# Patient Record
Sex: FEMALE
[De-identification: names, ages, dates, MRNs, and addresses within clinical notes are randomized per-mention and may not be internally consistent; named-entity substitution may affect disease eponyms.]

---

## 2022-04-04 ENCOUNTER — APPOINTMENT (OUTPATIENT)
Dept: PAIN MANAGEMENT | Facility: CLINIC | Age: 32
End: 2022-04-04

## 2022-04-29 PROBLEM — Z00.00 ENCOUNTER FOR PREVENTIVE HEALTH EXAMINATION: Status: ACTIVE | Noted: 2022-04-29

## 2022-06-13 ENCOUNTER — APPOINTMENT (OUTPATIENT)
Dept: PAIN MANAGEMENT | Facility: CLINIC | Age: 32
End: 2022-06-13
Payer: MEDICAID

## 2022-06-20 ENCOUNTER — APPOINTMENT (OUTPATIENT)
Dept: PAIN MANAGEMENT | Facility: CLINIC | Age: 32
End: 2022-06-20
Payer: MEDICAID

## 2022-06-20 VITALS — WEIGHT: 106 LBS | HEIGHT: 62 IN | BODY MASS INDEX: 19.51 KG/M2

## 2022-06-20 PROCEDURE — 99213 OFFICE O/P EST LOW 20 MIN: CPT

## 2022-06-20 NOTE — PHYSICAL EXAM
[de-identified] : Constitutional; Appears well, no apparent distress\par Ability to communicate: Normal \par Respiratory: non-labored breathing\par Skin: No rash noted\par Head: Normocephalic, atraumatic\par Neck: no visible thyroid enlargement\par Eyes: Extraocular movements intact\par Neurologic: Alert and oriented x3\par Psychiatric: normal mood, affect and behavior \par \par  [] : positive Sp[place]

## 2022-06-20 NOTE — DISCUSSION/SUMMARY
[de-identified] : After discussing various treatment options with the patient including but not limited to oral medications, physical therapy, exercise modalities as well as interventional spinal injections, we have decided with the following plan:\par \par - Continue home exercises, stretching, activity modification, physical therapy, and conservative care.\par - MRI report and/or images was reviewed and discussed with the patient.\par - Follow-up as needed.\par - Not interested in any medications or injections at this time. \par

## 2022-06-20 NOTE — HISTORY OF PRESENT ILLNESS
[4] : 4 [3] : 3 [Dull/Aching] : dull/aching [Radiating] : radiating [Stabbing] : stabbing [Intermittent] : intermittent [Household chores] : household chores [Sleep] : sleep [Physical therapy] : physical therapy [Nothing helps with pain getting better] : Nothing helps with pain getting better [] : yes [FreeTextEntry1] : r [FreeTextEntry7] : right trap/scapula [de-identified] : sleep on right side

## 2023-05-09 ENCOUNTER — APPOINTMENT (OUTPATIENT)
Dept: PAIN MANAGEMENT | Facility: CLINIC | Age: 33
End: 2023-05-09

## 2023-05-11 ENCOUNTER — APPOINTMENT (OUTPATIENT)
Dept: PAIN MANAGEMENT | Facility: CLINIC | Age: 33
End: 2023-05-11
Payer: MEDICAID

## 2023-05-11 VITALS — BODY MASS INDEX: 18.4 KG/M2 | HEIGHT: 62 IN | WEIGHT: 100 LBS

## 2023-05-11 PROCEDURE — 99214 OFFICE O/P EST MOD 30 MIN: CPT

## 2023-05-11 NOTE — HISTORY OF PRESENT ILLNESS
[Dull/Aching] : dull/aching [3] : 3 [Radiating] : radiating [Stabbing] : stabbing [Intermittent] : intermittent [Household chores] : household chores [Sleep] : sleep [Physical therapy] : physical therapy [Nothing helps with pain getting better] : Nothing helps with pain getting better [Neck] : neck [Upper back] : upper back [6] : 6 [FreeTextEntry1] : 05/11/23: Pain is now on the right aside of the neck and upper back and radiates down the right arm to the elbow but previously was radiating to the fingers. Did PT in Jan and cant start PT for another month. \par \par 6/20/22: Has done PT with little relief. Pain is on the right side of the neck and radiates towards the right shoulder. Not interested in any medications or any injections at this time. \par \par 12/28/21: Has been doing PT which is helping but switched insurances so needs new Rx. \par \par 8/20/21: Pain started to return and is on the right side of the neck and radiating towards the right shoulder. PT helped a lot last time. \par \par Initial HPI:\par Pain stared several years ago but only became constant in Jan 2019. Pain is on the right side of the neck and radiates towards the right shoulder and down the arm to the wrist described as a sharp shooting pain. Also has pain going down the right side of the upper back. Takes naproxen which helps. [] : no [FreeTextEntry7] : right trap/scapula [de-identified] : sleep on right side  [de-identified] : l mri

## 2023-05-11 NOTE — DISCUSSION/SUMMARY
[de-identified] : After discussing various treatment options with the patient including but not limited to oral medications, physical therapy, exercise, as well as interventional spinal injections, we have decided with the following plan:\par \par - Continue home exercises, stretching, activity modification, physical therapy, and conservative care.\par - Will order cervical MRI to rule out HNP. Please let this note serve as a formal request for authorization to perform a MRI of their Cervical Spine.\par - Follow-up post diagnostic imaging to review and discuss further treatment.\par - Will provide prescription for Physical Therapy.\par - Recommend Naproxen 500mg BID PRN. Potential adverse effects including but not limited to bleeding, ulcers, increased risk of hypertension, heart disease, kidney disease and stroke were discussed with the patient.  Medication would allow patient to be more mobile and perform ADL's.  Will continue to monitor patient and attempt to wean as soon as possible. Will use the lowest dosage possible for the shortest possible period of time.

## 2023-05-11 NOTE — PHYSICAL EXAM
[de-identified] : Constitutional; Appears well, no apparent distress\par Ability to communicate: Normal \par Respiratory: non-labored breathing\par Skin: No rash noted\par Head: Normocephalic, atraumatic\par Neck: no visible thyroid enlargement\par Eyes: Extraocular movements intact\par Neurologic: Alert and oriented x3\par Psychiatric: normal mood, affect and behavior \par \par  [] : negative [place]

## 2023-05-26 ENCOUNTER — APPOINTMENT (OUTPATIENT)
Dept: MRI IMAGING | Facility: CLINIC | Age: 33
End: 2023-05-26

## 2023-05-29 ENCOUNTER — FORM ENCOUNTER (OUTPATIENT)
Age: 33
End: 2023-05-29

## 2023-05-30 ENCOUNTER — APPOINTMENT (OUTPATIENT)
Dept: MRI IMAGING | Facility: CLINIC | Age: 33
End: 2023-05-30
Payer: MEDICAID

## 2023-05-30 PROCEDURE — 72141 MRI NECK SPINE W/O DYE: CPT

## 2023-06-13 ENCOUNTER — APPOINTMENT (OUTPATIENT)
Dept: PAIN MANAGEMENT | Facility: CLINIC | Age: 33
End: 2023-06-13
Payer: MEDICAID

## 2023-06-13 VITALS — WEIGHT: 100 LBS | HEIGHT: 62 IN | BODY MASS INDEX: 18.4 KG/M2

## 2023-06-13 PROCEDURE — 99214 OFFICE O/P EST MOD 30 MIN: CPT

## 2023-06-13 NOTE — PHYSICAL EXAM
[de-identified] : Constitutional; Appears well, no apparent distress\par Ability to communicate: Normal \par Respiratory: non-labored breathing\par Skin: No rash noted\par Head: Normocephalic, atraumatic\par Neck: no visible thyroid enlargement\par Eyes: Extraocular movements intact\par Neurologic: Alert and oriented x3\par Psychiatric: normal mood, affect and behavior \par \par  [] : negative [place]

## 2023-06-13 NOTE — DISCUSSION/SUMMARY
[de-identified] : After discussing various treatment options with the patient including but not limited to oral medications, physical therapy, exercise modalities as well as interventional spinal injections, we have decided with the following plan:\par \par - Continue Home exercises, stretching, activity modification, physical therapy, and conservative care.\par - MRI report and/or images was reviewed and discussed with the patient.\par - Recommend C7-T1 Cervical Epidural Steroid Injection under fluoroscopic guidance with image.\par - The risks, benefits and alternatives of the proposed procedure were explained in detail with the patient. The risks outlined include but are not limited to infection, bleeding, post-dural puncture headache, nerve injury, a temporary increase in pain, failure to resolve symptoms, allergic reaction, symptom recurrence, and possible elevation of blood sugar in diabetics. All questions were answered to patient's apparent satisfaction and he/she verbalized an understanding.\par - Patient is presenting with acute/sub-acute radicular pain with impairment in ADLs and functionality.  The pain has not responded to conservative care including NSAID therapy and/or physical therapy.  There is no bleeding tendency, unstable medical condition, or systemic infection.\par - Follow up in 1-2 weeks post injection for re-evaluation.\par - Will call to schedule.\par \par - Will provide prescription for Physical Therapy.

## 2023-06-13 NOTE — HISTORY OF PRESENT ILLNESS
[Neck] : neck [Upper back] : upper back [6] : 6 [3] : 3 [Dull/Aching] : dull/aching [Radiating] : radiating [Stabbing] : stabbing [Intermittent] : intermittent [Household chores] : household chores [Sleep] : sleep [Physical therapy] : physical therapy [Nothing helps with pain getting better] : Nothing helps with pain getting better [FreeTextEntry1] : 06/13/23 Pt here to go over C-spine MRI. Pain on the right side of the neck and radiates down the right arm to the fingers with associated numbness. Has been doing PT. \par \par MRI C-spine 5/30/23 independently reviewed: C5-6 right HNP with right C6 impingement. \par \par 05/11/23: Pain is now on the right side of the neck and upper back and radiates down the right arm to the elbow but previously was radiating to the fingers. Did PT in Jan and cant start PT for another month. \par \par 6/20/22: Has done PT with little relief. Pain is on the right side of the neck and radiates towards the right shoulder. Not interested in any medications or any injections at this time. \par \par 12/28/21: Has been doing PT which is helping but switched insurances so needs new Rx. \par \par 8/20/21: Pain started to return and is on the right side of the neck and radiating towards the right shoulder. PT helped a lot last time. \par \par Initial HPI:\par Pain stared several years ago but only became constant in Jan 2019. Pain is on the right side of the neck and radiates towards the right shoulder and down the arm to the wrist described as a sharp shooting pain. Also has pain going down the right side of the upper back. Takes naproxen which helps. [] : no [FreeTextEntry7] : right trap/scapula [de-identified] : sleep on right side  [de-identified] : l mri

## 2024-01-16 ENCOUNTER — APPOINTMENT (OUTPATIENT)
Dept: PAIN MANAGEMENT | Facility: CLINIC | Age: 34
End: 2024-01-16
Payer: COMMERCIAL

## 2024-01-16 VITALS — BODY MASS INDEX: 18.4 KG/M2 | WEIGHT: 100 LBS | HEIGHT: 62 IN

## 2024-01-16 DIAGNOSIS — M54.12 RADICULOPATHY, CERVICAL REGION: ICD-10-CM

## 2024-01-16 DIAGNOSIS — M54.2 CERVICALGIA: ICD-10-CM

## 2024-01-16 PROCEDURE — 99213 OFFICE O/P EST LOW 20 MIN: CPT

## 2024-01-16 RX ORDER — NAPROXEN 500 MG/1
500 TABLET ORAL
Qty: 30 | Refills: 0 | Status: ACTIVE | COMMUNITY
Start: 2023-05-11 | End: 1900-01-01

## 2024-01-16 NOTE — PHYSICAL EXAM
[de-identified] : Constitutional; Appears well, no apparent distress\par  Ability to communicate: Normal \par  Respiratory: non-labored breathing\par  Skin: No rash noted\par  Head: Normocephalic, atraumatic\par  Neck: no visible thyroid enlargement\par  Eyes: Extraocular movements intact\par  Neurologic: Alert and oriented x3\par  Psychiatric: normal mood, affect and behavior \par  \par   [] : negative [place]

## 2024-01-16 NOTE — HISTORY OF PRESENT ILLNESS
[Neck] : neck [Upper back] : upper back [6] : 6 [3] : 3 [Dull/Aching] : dull/aching [Radiating] : radiating [Stabbing] : stabbing [Intermittent] : intermittent [Household chores] : household chores [Sleep] : sleep [Physical therapy] : physical therapy [Nothing helps with pain getting better] : Nothing helps with pain getting better [FreeTextEntry1] : 01/16/2024: Pain started to return about a month ago and is on the b/l neck and radiates towards the b/l shoulders and down the scapula b/l.   06/13/23 Pt here to go over C-spine MRI. Pain on the right side of the neck and radiates down the right arm to the fingers with associated numbness. Has been doing PT.   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   05/11/23: Pain is now on the right side of the neck and upper back and radiates down the right arm to the elbow but previously was radiating to the fingers. Did PT in Jan and cant start PT for another month.   6/20/22: Has done PT with little relief. Pain is on the right side of the neck and radiates towards the right shoulder. Not interested in any medications or any injections at this time.   12/28/21: Has been doing PT which is helping but switched insurances so needs new Rx.   8/20/21: Pain started to return and is on the right side of the neck and radiating towards the right shoulder. PT helped a lot last time.   Initial HPI: Pain stared several years ago but only became constant in Jan 2019. Pain is on the right side of the neck and radiates towards the right shoulder and down the arm to the wrist described as a sharp shooting pain. Also has pain going down the right side of the upper back. Takes naproxen which helps. [] : no [FreeTextEntry7] : right trap/scapula [de-identified] : sleep on right side  [de-identified] : l mri

## 2024-01-16 NOTE — DISCUSSION/SUMMARY
[de-identified] : After discussing various treatment options with the patient including but not limited to oral medications, physical therapy, exercise modalities as well as interventional spinal injections, we have decided with the following plan:  - Continue home exercises, stretching, activity modification, physical therapy, and conservative care. - Follow-up as needed. - Will provide prescription for Physical Therapy. - Recommend Naproxen 500mg BID PRN. Potential adverse effects including but not limited to bleeding, ulcers, increased risk of hypertension, heart disease, kidney disease and stroke were discussed with the patient.  Medication would allow patient to be more mobile and perform ADL's.  Will continue to monitor patient and attempt to wean as soon as possible. Will use the lowest dosage possible for the shortest possible period of time.

## 2025-07-22 ENCOUNTER — APPOINTMENT (OUTPATIENT)
Dept: PAIN MANAGEMENT | Facility: CLINIC | Age: 35
End: 2025-07-22

## 2025-09-09 ENCOUNTER — APPOINTMENT (OUTPATIENT)
Dept: PAIN MANAGEMENT | Facility: CLINIC | Age: 35
End: 2025-09-09